# Patient Record
Sex: FEMALE | Race: BLACK OR AFRICAN AMERICAN | Employment: UNEMPLOYED | URBAN - METROPOLITAN AREA
[De-identification: names, ages, dates, MRNs, and addresses within clinical notes are randomized per-mention and may not be internally consistent; named-entity substitution may affect disease eponyms.]

---

## 2017-06-10 ENCOUNTER — HOSPITAL ENCOUNTER (EMERGENCY)
Age: 37
Discharge: HOME | End: 2017-06-10
Attending: INTERNAL MEDICINE
Payer: MEDICAID

## 2017-06-10 VITALS
BODY MASS INDEX: 28.7 KG/M2 | SYSTOLIC BLOOD PRESSURE: 138 MMHG | TEMPERATURE: 98.5 F | OXYGEN SATURATION: 100 % | DIASTOLIC BLOOD PRESSURE: 105 MMHG | WEIGHT: 152 LBS | RESPIRATION RATE: 18 BRPM | HEIGHT: 61 IN | HEART RATE: 112 BPM

## 2017-06-10 DIAGNOSIS — F54 PSYCHOGENIC FORMICATION: ICD-10-CM

## 2017-06-10 DIAGNOSIS — B85.2 PEDICULOSIS: Primary | ICD-10-CM

## 2017-06-10 DIAGNOSIS — R20.2 PSYCHOGENIC FORMICATION: ICD-10-CM

## 2017-06-10 PROCEDURE — 99282 EMERGENCY DEPT VISIT SF MDM: CPT

## 2017-06-10 RX ORDER — PERMETHRIN 50 MG/G
CREAM TOPICAL
Qty: 30 G | Refills: 0 | Status: SHIPPED | OUTPATIENT
Start: 2017-06-10 | End: 2017-07-10

## 2017-06-10 NOTE — ED TRIAGE NOTES
Pt c/o \"something crawling on me\" for 1-2 days. Nothing visible on pt or hair. Pt proceeded to pick her nose and \"see, its sticky. \"

## 2017-06-10 NOTE — ED PROVIDER NOTES
HPI Comments: 22 yo bf with h/o schizophrenia that states that she stayed at an [place] and feels that she has bed bugs or lice/mites on her body and in her hair. PMHx: Nonsmoker; +alcohol; NKDA. Meds: \"Doxyn? \"; Abilify; vistaril. PCP: Dr Lashae Silveira. Denies SI/HI    Patient is a 29 y.o. female presenting with other event. The history is provided by the patient. Other          History reviewed. No pertinent past medical history. History reviewed. No pertinent surgical history. History reviewed. No pertinent family history. Social History     Social History    Marital status: SINGLE     Spouse name: N/A    Number of children: N/A    Years of education: N/A     Occupational History    Not on file. Social History Main Topics    Smoking status: Current Every Day Smoker    Smokeless tobacco: Not on file    Alcohol use No    Drug use: Not on file    Sexual activity: Not on file     Other Topics Concern    Not on file     Social History Narrative    No narrative on file         ALLERGIES: Review of patient's allergies indicates no known allergies. Review of Systems   Skin: Positive for rash. Itching of skin       Vitals:    06/10/17 1834   BP: (!) 138/105   Pulse: (!) 112   Resp: 18   Temp: 98.5 °F (36.9 °C)   SpO2: 100%   Weight: 68.9 kg (152 lb)   Height: 5' 1\" (1.549 m)            Physical Exam   Constitutional: She is oriented to person, place, and time. She appears well-developed and well-nourished. No distress. Anxious female picking at her skin and putting her finger in her nose and pulling out phlegm which she says are bugs   HENT:   Head: Normocephalic. Mouth/Throat: No oropharyngeal exudate. Eyes: Pupils are equal, round, and reactive to light. Right eye exhibits no discharge. Neck: Normal range of motion. No JVD present. No thyromegaly present. Cardiovascular: Normal rate and regular rhythm. Exam reveals no gallop and no friction rub.     No murmur heard.  Pulmonary/Chest: Effort normal and breath sounds normal. No respiratory distress. She has no wheezes. She has no rales. She exhibits no tenderness. Abdominal: Soft. Bowel sounds are normal. She exhibits no distension. There is no tenderness. There is no rebound and no guarding. Musculoskeletal: Normal range of motion. Neurological: She is alert and oriented to person, place, and time. Skin: Skin is warm. No erythema. Pt pointing to areas on her skin and hair and feels strongly that these are from pediculosis although no clear mites are seen on hair or burrows on interdigital area. Psychiatric:   Well dressed; good eye contact; speaking coherently; no auditory or visual hallucinations; no SI/HI; picking at her nails and her nose to show me the bugs that she feels that she contracted in [address] note and vitals reviewed. MDM  ED Course       Procedures      ED DIAGNOSIS & DISPOSITION INFORMATION  Diagnosis:   1. Pediculosis    2. Psychogenic formication          Disposition: home    Follow-up Information     Follow up With Details Comments [address], DO Schedule an appointment as soon as possible for a visit If symptoms worsen, return to the ER [address] [facility]  [phone]            Patient's Medications   Start Taking    PERMETHRIN (ACTICIN) 5 % TOPICAL CREAM    Apply from neck to feet. Leave for 8 hrs and then wash off    PERMETHRIN (NIX) 1 % LOTION    Wash hair with conditioner free shampoo. Put lotion throughout hair. Leave in for 10 MINUTES! Rinse off.    Continue Taking    No medications on file   These Medications have changed    No medications on file   Stop Taking    No medications on file

## 2017-06-10 NOTE — ED NOTES
I have reviewed discharge instruction and prescriptions with patient multiple times. Patient verbalized understanding and has no further questions at this time. Education taught and patient verbalized understanding of education. Teach back method used. Patients pain 0/10. Belongings given to patient. Patient discharged with herself to home.

## 2017-06-10 NOTE — DISCHARGE INSTRUCTIONS
[employer] Activation    Thank you for requesting access to [employer]. Please follow the instructions below to securely access and download your online medical record. [employer] allows you to send messages to your doctor, view your test results, renew your prescriptions, schedule appointments, and more. How Do I Sign Up? 1. In your internet browser, go to [email or web address]. 2. Click on the First Time User? Click Here link in the Sign In box. You will see the New Member Sign Up page. 3. Enter your [employer] Access Code exactly as it appears below. You will not need to use this code after youve completed the sign-up process. If you do not sign up before the expiration date, you must request a new code. [employer] Access Code: [email or web address]  Expires: 2017  7:09 PM (This is the date your [employer] access code will )    4. Enter the last four digits of your Social Security Number (xxxx) and Date of Birth (mm/dd/yyyy) as indicated and click Submit. You will be taken to the next sign-up page. 5. Create a [employer] ID. This will be your [employer] login ID and cannot be changed, so think of one that is secure and easy to remember. 6. Create a [employer] password. You can change your password at any time. 7. Enter your Password Reset Question and Answer. This can be used at a later time if you forget your password. 8. Enter your e-mail address. You will receive e-mail notification when new information is available in [address]. 9. Click Sign Up. You can now view and download portions of your medical record. 10. Click the Download Summary menu link to download a portable copy of your medical information. Additional Information    If you have questions, please visit the Frequently Asked Questions section of the [employer] website at [email or web address] Remember, [employer] is NOT to be used for urgent needs. For medical emergencies, dial 911.